# Patient Record
Sex: FEMALE | Race: WHITE | Employment: FULL TIME | URBAN - METROPOLITAN AREA
[De-identification: names, ages, dates, MRNs, and addresses within clinical notes are randomized per-mention and may not be internally consistent; named-entity substitution may affect disease eponyms.]

---

## 2017-04-05 PROBLEM — K57.32 DIVERTICULITIS OF LARGE INTESTINE WITHOUT PERFORATION OR ABSCESS WITHOUT BLEEDING: Status: ACTIVE | Noted: 2017-04-05

## 2017-04-05 PROCEDURE — 81003 URINALYSIS AUTO W/O SCOPE: CPT | Performed by: FAMILY MEDICINE

## 2017-05-09 PROBLEM — K57.32 DIVERTICULITIS OF LARGE INTESTINE WITHOUT PERFORATION OR ABSCESS WITHOUT BLEEDING: Status: RESOLVED | Noted: 2017-04-05 | Resolved: 2017-05-09

## 2017-10-11 PROBLEM — R13.19 ESOPHAGEAL DYSPHAGIA: Status: ACTIVE | Noted: 2017-10-11

## 2017-10-11 PROBLEM — Z12.11 SCREENING FOR COLON CANCER: Status: ACTIVE | Noted: 2017-10-11

## 2017-10-16 PROBLEM — L29.9 ITCHY SKIN: Status: ACTIVE | Noted: 2017-10-16

## 2017-10-16 PROBLEM — Z12.11 SCREENING FOR COLON CANCER: Status: RESOLVED | Noted: 2017-10-11 | Resolved: 2017-10-16

## 2017-10-16 PROBLEM — Z87.19 HISTORY OF COLONIC DIVERTICULITIS: Status: ACTIVE | Noted: 2017-04-05

## 2017-10-16 PROCEDURE — 81003 URINALYSIS AUTO W/O SCOPE: CPT | Performed by: FAMILY MEDICINE

## 2018-03-07 PROCEDURE — 87186 SC STD MICRODIL/AGAR DIL: CPT | Performed by: PHYSICIAN ASSISTANT

## 2018-03-07 PROCEDURE — 87088 URINE BACTERIA CULTURE: CPT | Performed by: PHYSICIAN ASSISTANT

## 2018-03-07 PROCEDURE — 87086 URINE CULTURE/COLONY COUNT: CPT | Performed by: PHYSICIAN ASSISTANT

## 2018-06-06 PROBLEM — M22.2X9 PATELLA-FEMORAL SYNDROME: Status: ACTIVE | Noted: 2018-06-06

## 2018-06-06 PROBLEM — T79.A21A: Status: ACTIVE | Noted: 2018-06-06

## 2018-06-06 PROBLEM — M75.21 BICEPS TENDONITIS ON RIGHT: Status: ACTIVE | Noted: 2018-06-06

## 2018-06-06 PROCEDURE — 86200 CCP ANTIBODY: CPT | Performed by: INTERNAL MEDICINE

## 2018-06-21 PROBLEM — R29.898 RIGHT HAND WEAKNESS: Status: ACTIVE | Noted: 2018-06-21

## 2018-06-25 PROBLEM — M75.121 COMPLETE ROTATOR CUFF TEAR OR RUPTURE OF RIGHT SHOULDER, NOT SPECIFIED AS TRAUMATIC: Status: ACTIVE | Noted: 2018-06-08

## 2018-06-25 PROBLEM — R79.0 DECREASED FERRITIN: Status: ACTIVE | Noted: 2018-06-25

## 2018-06-25 PROBLEM — M06.00 SERONEGATIVE RHEUMATOID ARTHRITIS (HCC): Status: ACTIVE | Noted: 2018-06-25

## 2018-07-09 PROBLEM — M81.0 OSTEOPOROSIS: Status: ACTIVE | Noted: 2018-07-09

## 2018-07-20 ENCOUNTER — HOSPITAL ENCOUNTER (OUTPATIENT)
Dept: BONE DENSITY | Facility: HOSPITAL | Age: 63
Discharge: HOME | End: 2018-07-20
Attending: FAMILY MEDICINE

## 2018-07-20 DIAGNOSIS — Z13.9 SCREENING PROCEDURE: ICD-10-CM

## 2018-07-27 ENCOUNTER — TELEPHONE (OUTPATIENT)
Dept: FAMILY MEDICINE CLINIC | Facility: CLINIC | Age: 63
End: 2018-07-27

## 2018-07-27 NOTE — TELEPHONE ENCOUNTER
Pt calling for dexa scan results stated she has call before and no one will give results. Informed pt Kristioly Serrano is on Vacation and I spoke with  her partner and she will give her a call back with results.

## 2018-07-31 ENCOUNTER — TELEPHONE (OUTPATIENT)
Dept: FAMILY MEDICINE CLINIC | Facility: CLINIC | Age: 63
End: 2018-07-31

## 2018-08-14 PROBLEM — M81.0 AGE-RELATED OSTEOPOROSIS WITHOUT CURRENT PATHOLOGICAL FRACTURE: Status: ACTIVE | Noted: 2018-08-14

## 2018-08-14 PROCEDURE — 83970 ASSAY OF PARATHORMONE: CPT | Performed by: INTERNAL MEDICINE

## 2018-08-24 PROBLEM — J18.9 PNEUMONIA OF RIGHT LOWER LOBE DUE TO INFECTIOUS ORGANISM: Status: ACTIVE | Noted: 2018-08-24

## 2018-08-24 PROBLEM — R05.9 COUGH: Status: ACTIVE | Noted: 2018-08-24

## 2018-08-24 PROBLEM — N18.30 STAGE 3 CHRONIC KIDNEY DISEASE (HCC): Status: ACTIVE | Noted: 2018-08-24

## 2018-08-24 PROBLEM — K52.9 COLITIS: Status: ACTIVE | Noted: 2018-08-24

## 2018-08-24 PROBLEM — R50.9 FEVER, UNSPECIFIED FEVER CAUSE: Status: ACTIVE | Noted: 2018-08-24

## 2018-08-24 PROBLEM — R10.32 LEFT LOWER QUADRANT ABDOMINAL PAIN OF UNKNOWN ETIOLOGY: Status: ACTIVE | Noted: 2018-08-24

## 2018-08-24 PROBLEM — R74.8 ABNORMAL LIVER ENZYMES: Status: ACTIVE | Noted: 2018-08-24

## 2018-08-27 PROBLEM — R93.89 ABNORMAL CHEST X-RAY: Status: ACTIVE | Noted: 2018-08-27

## 2018-09-24 PROBLEM — J44.9 COPD (CHRONIC OBSTRUCTIVE PULMONARY DISEASE) (HCC): Status: ACTIVE | Noted: 2018-09-24

## 2018-10-10 PROBLEM — J44.9 COPD (CHRONIC OBSTRUCTIVE PULMONARY DISEASE) (HCC): Status: RESOLVED | Noted: 2018-09-24 | Resolved: 2018-10-10

## 2018-11-05 PROBLEM — R20.2 PARESTHESIA: Status: ACTIVE | Noted: 2018-11-05

## 2018-12-18 PROCEDURE — 80074 ACUTE HEPATITIS PANEL: CPT | Performed by: INTERNAL MEDICINE

## 2018-12-18 PROCEDURE — 36415 COLL VENOUS BLD VENIPUNCTURE: CPT | Performed by: INTERNAL MEDICINE

## 2018-12-19 PROCEDURE — 87088 URINE BACTERIA CULTURE: CPT | Performed by: EMERGENCY MEDICINE

## 2018-12-19 PROCEDURE — 87086 URINE CULTURE/COLONY COUNT: CPT | Performed by: EMERGENCY MEDICINE

## 2018-12-19 PROCEDURE — 87186 SC STD MICRODIL/AGAR DIL: CPT | Performed by: EMERGENCY MEDICINE

## 2019-01-26 PROBLEM — R05.9 COUGH: Status: RESOLVED | Noted: 2018-08-24 | Resolved: 2019-01-26

## 2019-01-26 PROBLEM — Z87.11 HISTORY OF PEPTIC ULCER: Status: ACTIVE | Noted: 2019-01-26

## 2019-01-26 PROBLEM — J18.9 PNEUMONIA OF RIGHT LOWER LOBE DUE TO INFECTIOUS ORGANISM: Status: RESOLVED | Noted: 2018-08-24 | Resolved: 2019-01-26

## 2019-08-11 PROCEDURE — 87186 SC STD MICRODIL/AGAR DIL: CPT | Performed by: EMERGENCY MEDICINE

## 2019-08-11 PROCEDURE — 87086 URINE CULTURE/COLONY COUNT: CPT | Performed by: EMERGENCY MEDICINE

## 2019-08-11 PROCEDURE — 87088 URINE BACTERIA CULTURE: CPT | Performed by: EMERGENCY MEDICINE

## 2020-01-27 ENCOUNTER — OFFICE VISIT (OUTPATIENT)
Dept: INTEGRATIVE MEDICINE | Facility: CLINIC | Age: 65
End: 2020-01-27
Payer: COMMERCIAL

## 2020-01-27 VITALS
OXYGEN SATURATION: 96 % | DIASTOLIC BLOOD PRESSURE: 82 MMHG | SYSTOLIC BLOOD PRESSURE: 120 MMHG | BODY MASS INDEX: 25.43 KG/M2 | WEIGHT: 162 LBS | HEART RATE: 98 BPM | HEIGHT: 67 IN

## 2020-01-27 DIAGNOSIS — M81.0 AGE-RELATED OSTEOPOROSIS WITHOUT CURRENT PATHOLOGICAL FRACTURE: Primary | ICD-10-CM

## 2020-01-27 DIAGNOSIS — R82.90 ABNORMAL URINE ODOR: ICD-10-CM

## 2020-01-27 DIAGNOSIS — M06.00 SERONEGATIVE RHEUMATOID ARTHRITIS (HCC): ICD-10-CM

## 2020-01-27 PROBLEM — R50.9 FEVER, UNSPECIFIED FEVER CAUSE: Status: RESOLVED | Noted: 2018-08-24 | Resolved: 2020-01-27

## 2020-01-27 PROCEDURE — 99204 OFFICE O/P NEW MOD 45 MIN: CPT | Performed by: FAMILY MEDICINE

## 2020-01-27 RX ORDER — FAMOTIDINE 20 MG/1
TABLET ORAL
COMMUNITY
Start: 2019-12-12

## 2020-01-27 NOTE — PROGRESS NOTES
Linward Councilman is a 59year old female. Patient presents with:  Establish Care: UTI issues      HPI:     Working with a . Has been lifting more weights, dead lifts 170 lbs. Working to build muscle and bulk up.    Did DEXA body comp in Number of children: Not on file      Years of education: Not on file      Highest education level: Not on file    Occupational History      Not on file    Social Needs      Financial resource strain: Not on file      Food insecurity:        Worry: Not Head: Normocephalic and atraumatic. Eyes: Pupils are equal, round, and reactive to light. Conjunctivae and EOM are normal.   Neck: Neck supple. No thyromegaly present. Cardiovascular: Normal rate and regular rhythm.    Pulmonary/Chest: Effort normal and The products and items listed below (the “Products”)  and their claims have not been evaluated by the Food and Drug Administration. Dietary products are not intended to treat, prevent, mitigate or cure disease.  Ultimately, you must draw your own conclusion Start taking 5 drops daily and double the dose every 7 days until taking 1.5 tsp twice daily. Mix in liquid of choice. Reduce to lowest dose tolerated if any reactions occur. Buy it online at  http://[employer]/ or at the [address]

## 2020-01-27 NOTE — PATIENT INSTRUCTIONS
I have complete milo in the body's ability to heal and transform. The products and items listed below (the “Products”)  and their claims have not been evaluated by the Food and Drug Administration.  Dietary products are not intended to treat, prevent, m A liquid supplement for gut health. This is a carbon, soil-based product that helps to rebuild the tight junctions, or important connections between cells, in the intestines.  These tight junctions get compromised by daily stress, reduced immune function an

## 2020-02-03 ENCOUNTER — HOSPITAL ENCOUNTER (OUTPATIENT)
Dept: BONE DENSITY | Facility: HOSPITAL | Age: 65
Discharge: HOME | End: 2020-02-03
Attending: FAMILY MEDICINE

## 2020-02-03 DIAGNOSIS — Z13.9 SCREENING FOR UNSPECIFIED CONDITION: ICD-10-CM

## 2020-02-07 ENCOUNTER — TELEPHONE (OUTPATIENT)
Dept: FAMILY MEDICINE CLINIC | Facility: CLINIC | Age: 65
End: 2020-02-07

## 2020-02-07 NOTE — TELEPHONE ENCOUNTER
Pt calling regarding DEXA results, pt informed she will receive a call and/or [employer] message once the report has been completed and the provider has reviewed it.  Pt asking to have DEXA dated 6/25/18 evaluated to see if the [id number] ratio was misc

## 2020-02-12 ENCOUNTER — TELEPHONE (OUTPATIENT)
Dept: INTEGRATIVE MEDICINE | Facility: CLINIC | Age: 65
End: 2020-02-12

## 2020-04-27 ENCOUNTER — VIRTUAL PHONE E/M (OUTPATIENT)
Dept: INTEGRATIVE MEDICINE | Facility: CLINIC | Age: 65
End: 2020-04-27
Payer: COMMERCIAL

## 2020-04-27 DIAGNOSIS — M81.0 AGE-RELATED OSTEOPOROSIS WITHOUT CURRENT PATHOLOGICAL FRACTURE: ICD-10-CM

## 2020-04-27 DIAGNOSIS — M06.00 SERONEGATIVE RHEUMATOID ARTHRITIS (HCC): Primary | ICD-10-CM

## 2020-04-27 PROCEDURE — 99213 OFFICE O/P EST LOW 20 MIN: CPT | Performed by: FAMILY MEDICINE

## 2020-04-27 NOTE — PATIENT INSTRUCTIONS
I have complete milo in the body's ability to heal and transform. The products and items listed below (the “Products”)  and their claims have not been evaluated by the Food and Drug Administration.  Dietary products are not intended to treat, prevent, m

## 2020-04-27 NOTE — PROGRESS NOTES
Nickolas Samayoa is a 72year old female. Patient presents with:   Follow - Up      HPI:     Virtual/Telephone Check-In    Nickolas Samayoa verbally consents to a Virtual/Telephone Check-In service on 4/27/20    Patient understands and accepts financial re Past Medical History:   Diagnosis Date   • Frequent urinary tract infections    • Retinal tear        CURRENT MEDICATIONS:     Current Outpatient Medications   Medication Sig Dispense Refill   • Hydroxychloroquine Sulfate 200 MG Oral Tab Take 1 tablet (200 Attends meetings of clubs or organizations: Not on file        Relationship status: Not on file      Intimate partner violence:        Fear of current or ex partner: Not on file        Emotionally abused: Not on file        Physically abused: Not on The products and items listed below (the “Products”)  and their claims have not been evaluated by the Food and Drug Administration. Dietary products are not intended to treat, prevent, mitigate or cure disease.  Ultimately, you must draw your own conclusion

## 2020-10-06 PROBLEM — E78.00 PURE HYPERCHOLESTEROLEMIA: Status: ACTIVE | Noted: 2020-10-06

## 2020-10-06 PROBLEM — R03.0 BLOOD PRESSURE ELEVATED WITHOUT HISTORY OF HTN: Status: ACTIVE | Noted: 2020-10-06

## 2020-10-06 PROBLEM — K21.9 GASTROESOPHAGEAL REFLUX DISEASE, UNSPECIFIED WHETHER ESOPHAGITIS PRESENT: Status: ACTIVE | Noted: 2020-10-06

## 2020-10-06 PROBLEM — N18.30 STAGE 3 CHRONIC KIDNEY DISEASE (HCC): Status: RESOLVED | Noted: 2018-08-24 | Resolved: 2020-10-06

## 2020-12-31 PROBLEM — M21.612 BILATERAL BUNIONS: Status: ACTIVE | Noted: 2020-12-31

## 2020-12-31 PROBLEM — M21.611 BILATERAL BUNIONS: Status: ACTIVE | Noted: 2020-12-31

## 2020-12-31 PROBLEM — I10 ESSENTIAL HYPERTENSION: Status: ACTIVE | Noted: 2020-12-31

## 2021-06-11 PROBLEM — M21.611 BILATERAL BUNIONS: Status: RESOLVED | Noted: 2020-12-31 | Resolved: 2021-06-11

## 2021-06-11 PROBLEM — M21.612 BILATERAL BUNIONS: Status: RESOLVED | Noted: 2020-12-31 | Resolved: 2021-06-11

## 2021-06-11 PROBLEM — M81.0 OSTEOPOROSIS: Status: RESOLVED | Noted: 2018-07-09 | Resolved: 2021-06-11

## 2021-06-11 PROBLEM — R03.0 BLOOD PRESSURE ELEVATED WITHOUT HISTORY OF HTN: Status: RESOLVED | Noted: 2020-10-06 | Resolved: 2021-06-11

## 2022-01-04 PROBLEM — F41.9 ANXIETY: Status: ACTIVE | Noted: 2022-01-04

## 2022-01-04 PROBLEM — I47.19 ATRIAL TACHYCARDIA, PAROXYSMAL (HCC): Status: ACTIVE | Noted: 2022-01-04

## 2022-01-04 PROBLEM — I47.1 ATRIAL TACHYCARDIA, PAROXYSMAL (HCC): Status: ACTIVE | Noted: 2022-01-04

## 2022-11-22 ENCOUNTER — ORDER TRANSCRIPTION (OUTPATIENT)
Dept: ADMINISTRATIVE | Facility: HOSPITAL | Age: 67
End: 2022-11-22

## 2022-11-22 DIAGNOSIS — Z13.9 ENCOUNTER FOR SCREENING: Primary | ICD-10-CM

## 2023-01-23 ENCOUNTER — HOSPITAL ENCOUNTER (OUTPATIENT)
Dept: BONE DENSITY | Facility: HOSPITAL | Age: 68
Discharge: HOME | End: 2023-01-23
Attending: INTERNAL MEDICINE
Payer: COMMERCIAL

## 2023-01-23 DIAGNOSIS — Z13.9 ENCOUNTER FOR SCREENING: ICD-10-CM

## 2024-06-10 ENCOUNTER — HOSPITAL ENCOUNTER (OUTPATIENT)
Facility: HOSPITAL | Age: 69
Setting detail: HOSPITAL OUTPATIENT SURGERY
Discharge: HOME | End: 2024-06-10
Attending: INTERNAL MEDICINE | Admitting: INTERNAL MEDICINE
Payer: COMMERCIAL

## 2024-06-10 VITALS
SYSTOLIC BLOOD PRESSURE: 142 MMHG | DIASTOLIC BLOOD PRESSURE: 62 MMHG | RESPIRATION RATE: 16 BRPM | TEMPERATURE: 98 F | OXYGEN SATURATION: 97 % | HEART RATE: 73 BPM

## 2024-06-10 PROCEDURE — 4A0B7BZ MEASUREMENT OF GASTROINTESTINAL PRESSURE, VIA NATURAL OR ARTIFICIAL OPENING: ICD-10-PCS | Performed by: INTERNAL MEDICINE

## 2024-06-10 RX ORDER — LIDOCAINE HYDROCHLORIDE 20 MG/ML
JELLY TOPICAL
Status: DISCONTINUED | OUTPATIENT
Start: 2024-06-10 | End: 2024-06-10

## 2024-06-10 NOTE — DISCHARGE INSTRUCTIONS
Home Care Instructions Following Esophageal Manometry    Diet:  Resume your regular diet as tolerated unless otherwise instructed.    Medication:  If you have questions about resuming your normal medications, please contact your Primary Care Physician.    Activities:  You may resume your normal daily activities.    What to Expect:  Throat discomfort  Runny/congested nose  Minor nose bleed    Contact Your Doctor If:  Active nose bleeding  Significant pain    **If unable to reach your doctor, please go to the [facility] Emergency Room**    - Your referring physician will receive a full report of your examination.  - If you do not hear from your doctor's office within two weeks of your procedure, please call them for your results.

## 2024-06-10 NOTE — OPERATIVE REPORT
[facility]                                                                      High Resolution Esophageal Manometry Study Operative Report    Che Gonzalez Patient Status:  Hospital Outpatient Surgery    1955 MRN [id number]   Location [facility] Attending No att. providers found   Hosp Day #   0 PCP Shane Villegas MD       Pre-op Diagnosis: HIATAL HERNIA WITH GASTROESOPHAGEAL REFLUX; ESOPHAGITIS     LA Grade C esophagitis and stricture on EGD 2024      Post-Op Diagnosis:    No achalasia    Procedure Performed: Procedure(s):  ESOPHAGEAL MANOMETRY     Informed Consent: Informed consent for the procedure was obtained from the patient.   Sedation Type: No sedation-Patient did not require sedation for this procedure  Procedure Description: The patient arrived after an overnight fast.  Topical anesthesia was applied the nasal passage.  The high resolution catheter with 36 circumferential sensors was passed through the nares into the stomach.  After acclimating to the catheter for 5 minutes, 10 wet swallows of 5 cc of water was given to assess motility.    Findings:   IRP:  8.6  50% peristaltic swallows as read by software, personally read and appear complete effective swallows  Distal contractile integral:  588.7 mmHg/cm/s  Distal latency:  6.7     Interpretation:  No major motility disorder such as achalasia    Recommendations: see [facility] surgeon for hiatal hernia repair    Discharge:  The patient was given an after visit summary detailing the procedure, findings, recommendations and follow up plans.    Kimo Garnica MD  6/10/2024  12:42 PM